# Patient Record
Sex: MALE | Race: WHITE | Employment: OTHER | URBAN - NONMETROPOLITAN AREA
[De-identification: names, ages, dates, MRNs, and addresses within clinical notes are randomized per-mention and may not be internally consistent; named-entity substitution may affect disease eponyms.]

---

## 2018-04-05 ENCOUNTER — APPOINTMENT (OUTPATIENT)
Dept: GENERAL RADIOLOGY | Facility: HOSPITAL | Age: 63
End: 2018-04-05
Attending: PHYSICIAN ASSISTANT
Payer: COMMERCIAL

## 2018-04-05 ENCOUNTER — HOSPITAL ENCOUNTER (EMERGENCY)
Facility: HOSPITAL | Age: 63
Discharge: ANOTHER HOSPITAL | End: 2018-04-05
Attending: PHYSICIAN ASSISTANT | Admitting: PHYSICIAN ASSISTANT
Payer: COMMERCIAL

## 2018-04-05 VITALS
SYSTOLIC BLOOD PRESSURE: 106 MMHG | HEIGHT: 72 IN | WEIGHT: 192 LBS | DIASTOLIC BLOOD PRESSURE: 55 MMHG | HEART RATE: 61 BPM | RESPIRATION RATE: 16 BRPM | TEMPERATURE: 97.9 F | OXYGEN SATURATION: 94 % | BODY MASS INDEX: 26.01 KG/M2

## 2018-04-05 DIAGNOSIS — I21.3 ST ELEVATION MYOCARDIAL INFARCTION (STEMI), UNSPECIFIED ARTERY (H): ICD-10-CM

## 2018-04-05 LAB
ANION GAP SERPL CALCULATED.3IONS-SCNC: 6 MMOL/L (ref 3–14)
BASOPHILS # BLD AUTO: 0.1 10E9/L (ref 0–0.2)
BASOPHILS NFR BLD AUTO: 1.2 %
BUN SERPL-MCNC: 16 MG/DL (ref 7–30)
CALCIUM SERPL-MCNC: 8.7 MG/DL (ref 8.5–10.1)
CHLORIDE SERPL-SCNC: 107 MMOL/L (ref 94–109)
CO2 SERPL-SCNC: 26 MMOL/L (ref 20–32)
CREAT SERPL-MCNC: 1.1 MG/DL (ref 0.66–1.25)
DIFFERENTIAL METHOD BLD: NORMAL
EOSINOPHIL # BLD AUTO: 0.3 10E9/L (ref 0–0.7)
EOSINOPHIL NFR BLD AUTO: 3.1 %
ERYTHROCYTE [DISTWIDTH] IN BLOOD BY AUTOMATED COUNT: 12.5 % (ref 10–15)
GFR SERPL CREATININE-BSD FRML MDRD: 67 ML/MIN/1.7M2
GLUCOSE SERPL-MCNC: 99 MG/DL (ref 70–99)
HCT VFR BLD AUTO: 46.4 % (ref 40–53)
HGB BLD-MCNC: 16 G/DL (ref 13.3–17.7)
IMM GRANULOCYTES # BLD: 0 10E9/L (ref 0–0.4)
IMM GRANULOCYTES NFR BLD: 0.4 %
LYMPHOCYTES # BLD AUTO: 2.5 10E9/L (ref 0.8–5.3)
LYMPHOCYTES NFR BLD AUTO: 30.8 %
MCH RBC QN AUTO: 31.7 PG (ref 26.5–33)
MCHC RBC AUTO-ENTMCNC: 34.5 G/DL (ref 31.5–36.5)
MCV RBC AUTO: 92 FL (ref 78–100)
MONOCYTES # BLD AUTO: 0.8 10E9/L (ref 0–1.3)
MONOCYTES NFR BLD AUTO: 9.6 %
NEUTROPHILS # BLD AUTO: 4.4 10E9/L (ref 1.6–8.3)
NEUTROPHILS NFR BLD AUTO: 54.9 %
NRBC # BLD AUTO: 0 10*3/UL
NRBC BLD AUTO-RTO: 0 /100
PLATELET # BLD AUTO: 221 10E9/L (ref 150–450)
POTASSIUM SERPL-SCNC: 3.9 MMOL/L (ref 3.4–5.3)
RBC # BLD AUTO: 5.04 10E12/L (ref 4.4–5.9)
SODIUM SERPL-SCNC: 139 MMOL/L (ref 133–144)
TROPONIN I SERPL-MCNC: 0.38 UG/L (ref 0–0.04)
WBC # BLD AUTO: 8 10E9/L (ref 4–11)

## 2018-04-05 PROCEDURE — 85025 COMPLETE CBC W/AUTO DIFF WBC: CPT | Performed by: PHYSICIAN ASSISTANT

## 2018-04-05 PROCEDURE — 25000128 H RX IP 250 OP 636: Performed by: PHYSICIAN ASSISTANT

## 2018-04-05 PROCEDURE — 99284 EMERGENCY DEPT VISIT MOD MDM: CPT | Mod: Z6 | Performed by: PHYSICIAN ASSISTANT

## 2018-04-05 PROCEDURE — 80048 BASIC METABOLIC PNL TOTAL CA: CPT | Performed by: PHYSICIAN ASSISTANT

## 2018-04-05 PROCEDURE — 96375 TX/PRO/DX INJ NEW DRUG ADDON: CPT

## 2018-04-05 PROCEDURE — 25000132 ZZH RX MED GY IP 250 OP 250 PS 637: Performed by: PHYSICIAN ASSISTANT

## 2018-04-05 PROCEDURE — 71045 X-RAY EXAM CHEST 1 VIEW: CPT | Mod: TC

## 2018-04-05 PROCEDURE — 93010 ELECTROCARDIOGRAM REPORT: CPT | Performed by: INTERNAL MEDICINE

## 2018-04-05 PROCEDURE — 96374 THER/PROPH/DIAG INJ IV PUSH: CPT

## 2018-04-05 PROCEDURE — 84484 ASSAY OF TROPONIN QUANT: CPT | Performed by: PHYSICIAN ASSISTANT

## 2018-04-05 PROCEDURE — 99285 EMERGENCY DEPT VISIT HI MDM: CPT | Mod: 25

## 2018-04-05 PROCEDURE — 93005 ELECTROCARDIOGRAM TRACING: CPT

## 2018-04-05 PROCEDURE — 36415 COLL VENOUS BLD VENIPUNCTURE: CPT | Performed by: PHYSICIAN ASSISTANT

## 2018-04-05 RX ORDER — ASPIRIN 81 MG/1
324 TABLET, CHEWABLE ORAL ONCE
Status: COMPLETED | OUTPATIENT
Start: 2018-04-05 | End: 2018-04-05

## 2018-04-05 RX ORDER — MORPHINE SULFATE 4 MG/ML
2 INJECTION, SOLUTION INTRAMUSCULAR; INTRAVENOUS ONCE
Status: COMPLETED | OUTPATIENT
Start: 2018-04-05 | End: 2018-04-05

## 2018-04-05 RX ORDER — MORPHINE SULFATE 4 MG/ML
4 INJECTION, SOLUTION INTRAMUSCULAR; INTRAVENOUS ONCE
Status: COMPLETED | OUTPATIENT
Start: 2018-04-05 | End: 2018-04-05

## 2018-04-05 RX ORDER — NITROGLYCERIN 0.4 MG/1
0.4 TABLET SUBLINGUAL EVERY 5 MIN PRN
Status: DISCONTINUED | OUTPATIENT
Start: 2018-04-05 | End: 2018-04-05 | Stop reason: HOSPADM

## 2018-04-05 RX ORDER — MORPHINE SULFATE 4 MG/ML
INJECTION, SOLUTION INTRAMUSCULAR; INTRAVENOUS
Status: DISCONTINUED
Start: 2018-04-05 | End: 2018-04-05 | Stop reason: HOSPADM

## 2018-04-05 RX ORDER — HEPARIN SODIUM 10000 [USP'U]/100ML
0-3500 INJECTION, SOLUTION INTRAVENOUS CONTINUOUS
Status: DISCONTINUED | OUTPATIENT
Start: 2018-04-05 | End: 2018-04-05 | Stop reason: HOSPADM

## 2018-04-05 RX ORDER — MORPHINE SULFATE 4 MG/ML
2 INJECTION, SOLUTION INTRAMUSCULAR; INTRAVENOUS EVERY 4 HOURS PRN
Status: DISCONTINUED | OUTPATIENT
Start: 2018-04-05 | End: 2018-04-05 | Stop reason: HOSPADM

## 2018-04-05 RX ADMIN — MORPHINE SULFATE 2 MG: 4 INJECTION, SOLUTION INTRAMUSCULAR; INTRAVENOUS at 10:17

## 2018-04-05 RX ADMIN — MORPHINE SULFATE 2 MG: 4 INJECTION, SOLUTION INTRAMUSCULAR; INTRAVENOUS at 10:28

## 2018-04-05 RX ADMIN — NITROGLYCERIN 0.4 MG: 0.4 TABLET SUBLINGUAL at 10:17

## 2018-04-05 RX ADMIN — SODIUM CHLORIDE 1000 ML: 9 INJECTION, SOLUTION INTRAVENOUS at 10:22

## 2018-04-05 RX ADMIN — TICAGRELOR 180 MG: 90 TABLET ORAL at 10:26

## 2018-04-05 RX ADMIN — NITROGLYCERIN 0.4 MG: 0.4 TABLET SUBLINGUAL at 10:21

## 2018-04-05 RX ADMIN — ASPIRIN 81 MG 324 MG: 81 TABLET ORAL at 10:16

## 2018-04-05 RX ADMIN — MORPHINE SULFATE 4 MG: 4 INJECTION, SOLUTION INTRAMUSCULAR; INTRAVENOUS at 10:38

## 2018-04-05 ASSESSMENT — ENCOUNTER SYMPTOMS
TROUBLE SWALLOWING: 0
SHORTNESS OF BREATH: 1
EYE DISCHARGE: 0
RHINORRHEA: 0
DIZZINESS: 1
CHEST TIGHTNESS: 0
FEVER: 0
FATIGUE: 0
COUGH: 0
CHILLS: 0
HEADACHES: 0
PALPITATIONS: 0
PSYCHIATRIC NEGATIVE: 1
LIGHT-HEADEDNESS: 0
WHEEZING: 0
SINUS PRESSURE: 0
SORE THROAT: 0

## 2018-04-05 NOTE — ED NOTES
Portable CXR done. Patient tolerated the positioning without difficulty. No change in the chest pain at this time.

## 2018-04-05 NOTE — ED NOTES
Critical result of  Trop value of 0.382  Reported to  Erwin GARCIA  Time given to provider 1036  Carito Mcdonald RN 10:36 AM 4/5/2018

## 2018-04-05 NOTE — ED NOTES
The patient's wife and daughter arrived to the ED and updated on the patient's status. The patient continues to report chest pain midsternal into the throat at 8/10 with no change after nitroglycerin or morphine at this time. Cardiac monitor remains in sinus piero 48-54, no ectopy. Diaphoretic and pale. Respirations unlabored.

## 2018-04-05 NOTE — ED NOTES
Patient reports no improvement in his mid chest pain with the nitroglycerin. Patient noted to be diaphoretic, pale, and heart rate in the 45-50's. No ectopy noted. ST elevation noted on the cardiac monitor.

## 2018-04-05 NOTE — ED NOTES
"The patient presents with midsternal chest pain that radiates into the throat area. He reports onset yesterday, the pain has been off and on, the most recent episode onset while he was walking into the ED. States it feels like his \"heartburn but worse\". States the normal Zantac and Pepto Bismol has not worked for the pain yesterday or today. Reports worse when walking, rates 7/10 currently. The patient reports he was at work prior to arrival.  "

## 2018-04-05 NOTE — ED NOTES
EKG done and to the provider. Patient is stating his midsternal chest pain is worsening from 7/10 to 8/10 and radiating to the jaw bilaterally. Provider notified.

## 2018-04-05 NOTE — ED NOTES
"Patient departure with the [facility] crew to the helicopter for transfer to [facility] in [place] for cardiology services. On departure the patient continued to have chest pain 8/10, midsternal with radiation to the throat with \"burning\" sensation. Cardiac monitor in a sinus bradycardia 48-50's with no ectopy and ST elevation noted on the cardiac monitor on departure. Patient remained diaphoretic and pale, respirations unlabored.  "

## 2018-04-05 NOTE — ED PROVIDER NOTES
History     Chief Complaint   Patient presents with     Chest Pain     midsternal nonradiating. onset lat night. took otc gerd medication with no relief     HPI  Adolph Mott is a 62 year old male with Hx of hypothyroidism who presents with 2 days of chest pain. It started yesterday and let up at night, allowing Adolph to sleep. He woke up this morning with return of pain. He took zantac, pepto and his wife's GERD medicine with NO improvement. He went to work and taking the stairs caused pain and shortness of breath, prompting visit today. Pain is sharp-burning, substernal and     Problem List:    Patient Active Problem List    Diagnosis Date Noted     Traumatic amputation of fingertip      Priority: Medium        Past Medical History:    Past Medical History:   Diagnosis Date     Traumatic amputation of fingertip        Past Surgical History:    Past Surgical History:   Procedure Laterality Date     AMPUTATE FINGER(S) Right 7/26/2016    Procedure: AMPUTATE FINGER(S);  Surgeon: Francisco Dennis MD;  Location: [facility] OR       Family History:    Family History   Problem Relation Age of Onset     Family history unknown: Yes       Social History:  Marital Status:   [2]  Social History   Substance Use Topics     Smoking status: Current Every Day Smoker     Packs/day: 0.25     Years: 40.00     Types: Cigarettes     Last attempt to quit: 8/1/2016     Smokeless tobacco: Not on file     Alcohol use Not on file        Medications:      ASPIRIN PO   LEVOTHYROXINE SODIUM PO   Naproxen Sodium (ALEVE PO)         Review of Systems   Constitutional: Negative for chills, fatigue and fever.   HENT: Negative for congestion, ear pain, postnasal drip, rhinorrhea, sinus pressure, sore throat and trouble swallowing.    Eyes: Negative for discharge.   Respiratory: Positive for shortness of breath. Negative for cough, chest tightness and wheezing.    Cardiovascular: Positive for chest pain. Negative for palpitations.   Skin:  Negative.    Neurological: Positive for dizziness. Negative for light-headedness and headaches.   Psychiatric/Behavioral: Negative.        Physical Exam   BP: 168/91  Pulse: 61  Heart Rate: 52  Temp: 97.9  F (36.6  C)  Resp: 18  Height: 182.9 cm (6')  Weight: 87.1 kg (192 lb)  SpO2: 97 %      Physical Exam   Constitutional: He is oriented to person, place, and time. He appears well-developed and well-nourished. No distress (pt initially in NAD, pleasant, talkative).   Eyes: Conjunctivae are normal.   Cardiovascular: Normal rate and normal heart sounds.    Pulmonary/Chest: Effort normal and breath sounds normal. He exhibits tenderness (anterior as indicated).       Abdominal: Soft. Bowel sounds are normal. There is no tenderness.   Neurological: He is alert and oriented to person, place, and time.   Skin: Skin is warm and dry.   Psychiatric: He has a normal mood and affect.   Nursing note and vitals reviewed.      ED Course     ED Course     Procedures               EKG Interpretation:      Interpreted by Erwin Mendez  Time reviewed: 1012  Symptoms at time of EKG: CP   Rhythm: sinus bradycardia  Rate: 52  Axis: Normal  Ectopy: none  Conduction: nonspecific interventricular conduction block  ST Segments/ T Waves: ST Segement Elevation II, III and aVF  Comparison to prior: No old EKG available    Clinical Impression: myocardial infarction  inferior         Results for orders placed or performed during the hospital encounter of 04/05/18 (from the past 24 hour(s))   CBC with platelets differential   Result Value Ref Range    WBC 8.0 4.0 - 11.0 10e9/L    RBC Count 5.04 4.4 - 5.9 10e12/L    Hemoglobin 16.0 13.3 - 17.7 g/dL    Hematocrit 46.4 40.0 - 53.0 %    MCV 92 78 - 100 fl    MCH 31.7 26.5 - 33.0 pg    MCHC 34.5 31.5 - 36.5 g/dL    RDW 12.5 10.0 - 15.0 %    Platelet Count 221 150 - 450 10e9/L    Diff Method Automated Method     % Neutrophils 54.9 %    % Lymphocytes 30.8 %    % Monocytes 9.6 %    % Eosinophils 3.1 %     % Basophils 1.2 %    % Immature Granulocytes 0.4 %    Nucleated RBCs 0 0 /100    Absolute Neutrophil 4.4 1.6 - 8.3 10e9/L    Absolute Lymphocytes 2.5 0.8 - 5.3 10e9/L    Absolute Monocytes 0.8 0.0 - 1.3 10e9/L    Absolute Eosinophils 0.3 0.0 - 0.7 10e9/L    Absolute Basophils 0.1 0.0 - 0.2 10e9/L    Abs Immature Granulocytes 0.0 0 - 0.4 10e9/L    Absolute Nucleated RBC 0.0    Basic metabolic panel   Result Value Ref Range    Sodium 139 133 - 144 mmol/L    Potassium 3.9 3.4 - 5.3 mmol/L    Chloride 107 94 - 109 mmol/L    Carbon Dioxide 26 20 - 32 mmol/L    Anion Gap 6 3 - 14 mmol/L    Glucose 99 70 - 99 mg/dL    Urea Nitrogen 16 7 - 30 mg/dL    Creatinine 1.10 0.66 - 1.25 mg/dL    GFR Estimate 67 >60 mL/min/1.7m2    GFR Estimate If Black 82 >60 mL/min/1.7m2    Calcium 8.7 8.5 - 10.1 mg/dL   Troponin I   Result Value Ref Range    Troponin I ES 0.382 ([facility]) 0.000 - 0.045 ug/L   XR Chest Port 1 View    Narrative    PROCEDURE: XR CHEST PORT 1 VW 4/5/2018 10:43 AM    HISTORY: Chest pain;     COMPARISONS: 7/11/2010.    TECHNIQUE: Portable views.    FINDINGS: Heart is not enlarged. There is mild elongation of the  thoracic aorta. Lungs are clear and no pleural effusion is seen.         Impression    IMPRESSION: No acute disease.    ASHER QUIGLEY MD       Medications   morphine (PF) injection 2 mg (2 mg Intravenous Given 4/5/18 1028)   nitroGLYcerin (NITROSTAT) sublingual tablet 0.4 mg (0.4 mg Sublingual Given 4/5/18 1021)   heparin  drip 25,000 units in 0.45% NaCl 250 mL (see additional administration details for dose) (0 Units/hr Intravenous Not Given 4/5/18 1045)   aspirin chewable tablet 324 mg (324 mg Oral Given 4/5/18 1016)   ticagrelor (BRILINTA) tablet 180 mg (180 mg Oral Given 4/5/18 1026)   heparin Loading Dose from infusion pump *Give when STARTING heparin infusion 5,250 Units (4,000 Units Intravenous Given 4/5/18 1048)   morphine (PF) injection 2 mg (2 mg Intravenous Given 4/5/18 1017)   morphine (PF)  injection 4 mg (4 mg Intravenous Given 4/5/18 1038)   0.9% sodium chloride BOLUS (0 mLs Intravenous ED Infusing on Admission/transfer 4/5/18 1054)       Assessments & Plan (with Medical Decision Making)     I have reviewed the nursing notes.  I have reviewed the findings, diagnosis, plan and need for follow up with the patient.      New Prescriptions    No medications on file       Final diagnoses:   ST elevation myocardial infarction (STEMI), unspecified artery ([facility])   ASA administered, EKG obtained and reveals STEMI. Pt initially received 1 SL nitro and morphine with mild improvement, so given second sublingual nitro and additional 2mg morphine bringing BP down to 90s/70s but no improvement with pain.  I consulted with Dr Alan and Dr Kiser and Adolph will be transferred by air; he received Brilinta 180 and heparin protocol. Portable CXR in process. 4 additional mg morphine administered prior to air transport.    4/5/2018   [facility]       Erwin Mendez PA  04/05/18 1122

## 2018-04-05 NOTE — ED NOTES
[facility] helicopter crew arrived and given hand off report on patient status and to initiate Heparin Drip Infusion.

## 2018-04-11 ENCOUNTER — HOSPITAL ENCOUNTER (OUTPATIENT)
Dept: CARDIAC REHAB | Facility: HOSPITAL | Age: 63
Setting detail: THERAPIES SERIES
End: 2018-04-11
Attending: INTERNAL MEDICINE
Payer: COMMERCIAL

## 2018-04-11 VITALS — WEIGHT: 191 LBS | HEIGHT: 72 IN | BODY MASS INDEX: 25.87 KG/M2

## 2018-04-11 PROCEDURE — 40000116 ZZH STATISTIC OP CR VISIT

## 2018-04-11 PROCEDURE — 93798 PHYS/QHP OP CAR RHAB W/ECG: CPT

## 2018-04-11 ASSESSMENT — 6 MINUTE WALK TEST (6MWT)
TOTAL DISTANCE WALKED (FT): 1595
GENDER SELECTION: MALE
FEMALE CALC: 1606.95
MALE CALC: 1988.8
PREDICTED: 2000.93

## 2018-04-11 NOTE — PROGRESS NOTES
04/11/18 0900   Session   Session Initial Evaluation and Exercise Prescription   Certified through this date 05/10/18   Cardiac Rehab Assessment   Cardiac Rehab Assessment 4/11: Pt has initiated Phase II cardiac rehab following STEMI and PCI to RCA on 4/5/18. Following PCI procedure pt did experience fast rhythm of atrial fibrillation followed by asystole and was cardioverted twice. Pt's rhythm returned to normal sinus without further complications. Staff has placed pt as a high risk pt due to cardiac complications following surgery and will continue to monitor and assess pt during Phase II rehab.     Overall, pt is doing well post discharge and has resumed previous walking regimen of 1 mile most days of the week. Pt tolerates walking program but does experience occasional episodes of SOB since being started on cardiac medications. Staff unable to verify medications at the present but has encouraged pt to bring med list during on 4/13/18, for 1st exercise class. Pt verbally understands and agrees to the terms. Staff is certain pt was prescribed Brilinta and pt is experiencing the common side effect of SOB.     Staff discussed pt's resume to work and it is apparent that pt is unaware of the date in which they can return to employment. Pt is eager to become physically active and return to previous activities without extra precautions. Pt has follow-up with PCP on Monday, April 16th, 2018, and pt is hoping they will sharon pt permission to return to work sooner rather than later. If pt does return to work, staff has offered pt a later class time but is aware that pt may not be able to attend educational sessions due to timing. Staff can provide pt with educational handouts if there is a desire to learn about personal risk factor and they are unable to attend sessions in person.    The patient's history and clinical status including hemodynamics and ECG were evaluated. The patient was assessed to be stable and  "appropriate to begin exercise.   The patient's functional capacity and exercise prescription were determined by the completion of the 6 minute walk test. See results above. The patient was oriented to the program.  Risk factor profile was completed. Goals and objectives were discussed. CV response was WNL. No symptoms, complaints or pain were reported. Good prognosis for reaching goals below. Skilled therapy is necessary in order to monitor CV response to exercise, to provide education on risk factors and behavior change counseling needed to achieve patient's goals.  Plan to progress to 30-40 minutes of exercise prior to discharge from cardiac rehab.  Initial THR of 20-30 beats above RHR; Effort rating of 4-6. Initiate muscle conditioning as appropriate. Provide risk factor education and behavior change counseling.    General Information   Treatment Diagnosis STEMI   Date of Treatment Diagnosis 04/05/18   Secondary Treatment Diagnosis Stent   Significant Past CV History None   Lead up symptoms 4/11: Pt experienced \"heart burn\" on 4/4/18, left work early and took Zantac. Pain subsided slightly and pt went to bed. Following morning pt was at work climbing flights of stairs and noticed \"heart burn\" had returned and took a coffee break to take more medications for symptoms. Pain began to radiate through chest and upper jaw, pt decided to visit ED in [place].   Hospital Location [facility] Discharge Date 04/07/18   Signs and Symptoms Post Hospital Discharge None   Outpatient Cardiac Rehab Start Date 04/11/18   Primary Physician Virgil   Primary Physician Follow Up Scheduled  (April 16th, 2018)   Cardiologist Rashel   Cardiologist Follow Up Scheduled  (May 5th, 2018)   Ejection Fraction 45%   Risk Stratification High   Summary of Cath Report   Summary of Cath Report Available   Date Performed 04/05/18   Left Main Normal   LAD Normal   D1 Normal   D2 Normal   LCX Normal    OM Ostial OM1 70%, normal OM2 "   RCA Mid 100% thrombotic lesion   Cath Report Comments 4/11: Electrical cardioversion x2 for hemodynamically unstable rapid atrial fibrillation with successful conversion to sinus rhythm.   Living and Work Status    Living Arrangements and Social Status house;spouse;other relative  (Son)   Support System Live with an adult;Check-in help as needed   Return to Employment Unknown  (Pt expects to return to work but is uncertain when.)   Occupation [employer]-    Preventative Medications   CMS recommended medications (Pt advised to bring current list of meds at next appt. )   Fall Risk Screen   Fall screen completed by Cardiac Rehab   Have you fallen 2 or more times in the past year? Yes   Have you fallen and had an injury in the past year? No   Timed Up and Go score (seconds) NA   Is patient a fall risk? No   Fall screen comments 4/11: Pt has tripped and fell due to uneven ground but does not have mobility concerns.    Pain   Patient Currently in Pain No   Physical Assessments   Incisions WNL   Edema None   Right Lung Sounds not assessed   Left Lung Sounds not assessed   Limitations No limitations   Comments 4/11: Pt's physical assessments are WNL.    Individualized Treatment Plan   Monitored Sessions Scheduled 24   Monitored Sessions Attended 1   Oxygen   Supplemental Oxygen needed No   Nutrition Management - Weight Management   Assessment Initial Assessment   Age 62   Weight 86.6 kg (191 lb)   Height 1.829 m (6')   BMI (Calculated) 25.96   Initial Rate Your Plate Score. Dietary tool to assess eating patterns. Scores range from 24 to 72. The higher the score the healthier the eating pattern. 46   Weight Management Comments 4/11: Pt is content with current weight but does note pt cannot tolerate previous quantity of food since PCI. Pt will scale back on the amount of food due to general malaise following meals.    Nutrition Management - Lipids   Lipids Labs Available   Date 04/05/18   Total  Cholesterol 173   Triglycerides 43   HDL 46      Prescribed Lipid Medication Yes   Lipid Comments 4/11: Pt does not have current list of medications during evaluation and has been encouraged to bring list upon next cardiac visit.    Nutrition Management - Diabetes   Diabetes No   Nutrition Management Summary   Dietary Recommendations Low Fat;Low Cholesterol;Low Sodium   Stages of Change for Diet Compliance Preparation   Interventions Planned Attend Nutrition Education Class(es)   Nutrition Summary Comments 4/11: Currently, pt does not have any questions pertaining to diet. Pt has been recommended to limit Na+ content and to reduce the amount of red meat meal options.    Nutrition Target Outcome Total Chol < 150, HDL > 40 (M), HDL > 50 (W), LDL < 70, Trig < 150   Psychosocial Management   Psychosocial Assessment Initial   Is there history of clinical depression or increased risk of depression? No previous history   Current Level of Stress per Patient Report Denies   Current Coping Skills Other (see comments)  (Watch TV, walk ,or work in garage)   Initial Patient Health Questionnaire -9 Score (PHQ-9) for depression. 5-9 Minimal symptoms, 10-14 Minor depression, 15-19 Major depression, moderately severe, > 20 Major depression, severe  0   Initial [facility] Survey score.  Quality of Life:   If total score > 25 review individual areas where patient rated a 4 or 5.  Consider patients current medical condition and what role that plays on the score.   Adjust treatment protocol to improve areas of concern.  Consider the following:  PHQ9 score, DASI, and re-assessment within the next 30 days to assist with developing treatments.  18   Stages of Change Preparation   Patient Goal No   Psychosocial Target Outcome Identify absence or presence of depression using valid screening tool   Other Core Components - Hypertension   History of or Diagnosis of Hypertension No   Currently taking Anti-Hypertensives No  "  Hypertension Comments 4/11: Staff notes in [facility] discharge that pt has not been prescribed beta blocker due to hypotension concerns. Pt's blood pressures WNL.   Other Core Components - Tobacco   History of Tobacco Use Yes   Quit Date or Planned Quit Date 04/05/18   Tobacco Use Status Recent (Quit < 6 mo ago)   Tobacco Habit Cigarettes   Tobacco Use per Day (average) 4-6 cigarettes   Years of Tobacco Use 45   Stages of Change Action   Tobacco Comments 4/11: Pt quit \"[place]\" following STEMI event on 4/5/18.   Other Core Components Summary   Interventions Planned None   Other Core Components Comments 4/11: Pt does not seek additional core components to be assessed.   Other Core Components Target Outcome BP < 140/90 or < 130/80 with DM or CKD   Activity/Exercise History   Activity/Exercise Assessment Initial   Activity/Exercise Status prior to event? Was Physically Active   Number of Days Currently participating in Moderate Physical Activity? 4-5   Number of Days Currently performing  Aerobic Exercise (including rehab)? 0   Number of Minutes per Session Currently of Aerobic Exercise (average)? 0   Current Stage of Change (Physical Activity) Maintenance   Current Stage of Change (Aerobic Exercise) Preparation   Patient Goals Goal #1   Goal #1 Description 4/11: Pt would like to walk 1.5-2 miles 4-5 days/week by June 15th, 2018.   Goal #1 Target Date 06/15/18   Goal #1 Progress Towards Goal 4/11: Pt walks 1 mile 4-5 days/week to visit with sister.    Activity/Exercise Comments 4/11: Pt states they are ready to resume work for the physicality of the job and to maintain activity. Pt has a hard time being sedentary and is ready to be released back to work,    Activity/Exercise Target Outcome An Accumulation of 150  Minutes of Aerobic Activity per Week   Exercise Assessment   6 Minute Walk Predicted - Gender Selection Male   6 Minute Walk Predicted (Male) 1988.8   6 Minute Walk Predicted (Female) 1606.95   Initial " 6 Minute Walk Distance (Feet) 1595 ft   Resting HR 55 bpm   Exercise  bpm   Post Exercise HR 58 bpm   Resting /62   Exercise /60   Post Exercise /60   Pre SpO2 98   While Exercising SpO2 98   Post SpO2 99   Effort Rating 4   Current MET Level 2.9   MET Level Goal 5   ECG Rhythm Sinus bradycardia   Ectopy None   Current Symptoms Denies symptoms   Limitations/Restrictions None   Exercise Prescription   Mode Treadmill;Recumbant bike;Arm Ergometer;Weights;Calisthenics   Duration/Time 15-30 min   Frequency 3 daysweek   THR (85% of age predicted max HR) 133.45   OMNI Effort Rating (0-10 Scale) 4-6/10   Progression Continuous bouts;Total exercise time of 20-30 minutes   Comments 4/11: Staff introduced to cardiac gym and will create exercise prescription that is appropriate for pt based on hemodynamic response.    Recommended Home Exercise   Type of Exercise Walking   Frequency (days per week) 4-5   Duration (minutes per session) 15-30 min   Effort Rating Recommended 4-6/10   30 Day Exercise Plan 4/11: Pt currently walking 1 mile most days of the week and is interested to walk 2x per day, before return to work.    Current Home Exercise   Type of Exercise Walking   Frequency (days per week) 4-5   Duration (minutes per session) 15 minute bouts    Follow-up/On-going Support   Provider follow-up needed on the following Other (see comments)  (SOB)   Comments 4/11: Once pt returns to Phase II with current medications, staff to fax PCP or cardiology about SOB if pt has been prescribed Brilinta.    Learning Assessment   Learner Patient   Primary Language English   Preferred Learning Style Listening;Reading;Demonstration;Pictures/Video   Barriers to Learning No barriers noted   Patient Education   Education recommended Anatomy and Physiology of the Heart;Blood Pressure;Exercise Principles;Heart Failure;Medication Overview;Muscle Conditioning;Nutrition;Risk Factors;Smoking Cessation   Education Comments 4/11:  Pt has been advised regarding education, to which pt sounds interested. Staff is aware that pt plans to resume work and may not be able to attend sessions if they return to working status.    Physician cosignature/electronic signature indicates approval of this ITP document. I have established, reviewed and made necessary changes to the individualized treatment plan and exercise prescription for this patient.

## 2018-04-13 ENCOUNTER — HOSPITAL ENCOUNTER (OUTPATIENT)
Dept: CARDIAC REHAB | Facility: HOSPITAL | Age: 63
Setting detail: THERAPIES SERIES
End: 2018-04-13
Attending: INTERNAL MEDICINE
Payer: COMMERCIAL

## 2018-04-13 PROCEDURE — 40000116 ZZH STATISTIC OP CR VISIT

## 2018-04-13 PROCEDURE — 93798 PHYS/QHP OP CAR RHAB W/ECG: CPT

## 2018-04-16 ENCOUNTER — HOSPITAL ENCOUNTER (OUTPATIENT)
Dept: CARDIAC REHAB | Facility: HOSPITAL | Age: 63
Setting detail: THERAPIES SERIES
End: 2018-04-16
Attending: INTERNAL MEDICINE
Payer: COMMERCIAL

## 2018-04-16 PROCEDURE — 40000116 ZZH STATISTIC OP CR VISIT

## 2018-04-16 PROCEDURE — 93798 PHYS/QHP OP CAR RHAB W/ECG: CPT

## 2018-04-18 ENCOUNTER — HOSPITAL ENCOUNTER (OUTPATIENT)
Dept: CARDIAC REHAB | Facility: HOSPITAL | Age: 63
Setting detail: THERAPIES SERIES
End: 2018-04-18
Attending: INTERNAL MEDICINE
Payer: COMMERCIAL

## 2018-04-18 PROCEDURE — 40000116 ZZH STATISTIC OP CR VISIT

## 2018-04-18 PROCEDURE — 93798 PHYS/QHP OP CAR RHAB W/ECG: CPT

## 2018-04-20 ENCOUNTER — HOSPITAL ENCOUNTER (OUTPATIENT)
Dept: CARDIAC REHAB | Facility: HOSPITAL | Age: 63
Setting detail: THERAPIES SERIES
End: 2018-04-20
Attending: INTERNAL MEDICINE
Payer: COMMERCIAL

## 2018-04-20 PROCEDURE — 93798 PHYS/QHP OP CAR RHAB W/ECG: CPT

## 2018-04-20 PROCEDURE — 40000116 ZZH STATISTIC OP CR VISIT

## 2018-04-23 ENCOUNTER — HOSPITAL ENCOUNTER (OUTPATIENT)
Dept: CARDIAC REHAB | Facility: HOSPITAL | Age: 63
Setting detail: THERAPIES SERIES
End: 2018-04-23
Attending: INTERNAL MEDICINE
Payer: COMMERCIAL

## 2018-04-23 PROCEDURE — 40000116 ZZH STATISTIC OP CR VISIT

## 2018-04-23 PROCEDURE — 93798 PHYS/QHP OP CAR RHAB W/ECG: CPT

## 2018-04-25 ENCOUNTER — HOSPITAL ENCOUNTER (OUTPATIENT)
Dept: CARDIAC REHAB | Facility: HOSPITAL | Age: 63
Setting detail: THERAPIES SERIES
End: 2018-04-25
Attending: INTERNAL MEDICINE
Payer: COMMERCIAL

## 2018-04-25 PROCEDURE — 40000116 ZZH STATISTIC OP CR VISIT

## 2018-04-25 PROCEDURE — 93798 PHYS/QHP OP CAR RHAB W/ECG: CPT

## 2018-04-27 ENCOUNTER — HOSPITAL ENCOUNTER (OUTPATIENT)
Dept: CARDIAC REHAB | Facility: HOSPITAL | Age: 63
Setting detail: THERAPIES SERIES
End: 2018-04-27
Attending: INTERNAL MEDICINE
Payer: COMMERCIAL

## 2018-04-27 PROCEDURE — 93798 PHYS/QHP OP CAR RHAB W/ECG: CPT

## 2018-04-27 PROCEDURE — 40000116 ZZH STATISTIC OP CR VISIT

## 2018-04-30 ENCOUNTER — HOSPITAL ENCOUNTER (OUTPATIENT)
Dept: CARDIAC REHAB | Facility: HOSPITAL | Age: 63
Setting detail: THERAPIES SERIES
End: 2018-04-30
Attending: INTERNAL MEDICINE
Payer: COMMERCIAL

## 2018-04-30 PROCEDURE — 40000116 ZZH STATISTIC OP CR VISIT

## 2018-04-30 PROCEDURE — 93798 PHYS/QHP OP CAR RHAB W/ECG: CPT

## 2018-05-02 ENCOUNTER — HOSPITAL ENCOUNTER (OUTPATIENT)
Dept: CARDIAC REHAB | Facility: HOSPITAL | Age: 63
Setting detail: THERAPIES SERIES
End: 2018-05-02
Attending: INTERNAL MEDICINE
Payer: COMMERCIAL

## 2018-05-02 PROCEDURE — 40000116 ZZH STATISTIC OP CR VISIT

## 2018-05-02 PROCEDURE — 93798 PHYS/QHP OP CAR RHAB W/ECG: CPT

## 2018-05-04 ENCOUNTER — HOSPITAL ENCOUNTER (OUTPATIENT)
Dept: CARDIAC REHAB | Facility: HOSPITAL | Age: 63
Setting detail: THERAPIES SERIES
End: 2018-05-04
Attending: INTERNAL MEDICINE
Payer: COMMERCIAL

## 2018-05-04 PROCEDURE — 93798 PHYS/QHP OP CAR RHAB W/ECG: CPT

## 2018-05-04 PROCEDURE — 40000116 ZZH STATISTIC OP CR VISIT

## 2018-05-07 ENCOUNTER — HOSPITAL ENCOUNTER (OUTPATIENT)
Dept: CARDIAC REHAB | Facility: HOSPITAL | Age: 63
Setting detail: THERAPIES SERIES
End: 2018-05-07
Attending: INTERNAL MEDICINE
Payer: COMMERCIAL

## 2018-05-07 PROCEDURE — 93798 PHYS/QHP OP CAR RHAB W/ECG: CPT

## 2018-05-07 PROCEDURE — 40000116 ZZH STATISTIC OP CR VISIT

## 2018-05-08 VITALS — HEIGHT: 72 IN | WEIGHT: 187 LBS | BODY MASS INDEX: 25.33 KG/M2

## 2018-05-08 ASSESSMENT — 6 MINUTE WALK TEST (6MWT)
MALE CALC: 1999.8
FEMALE CALC: 1620.75
TOTAL DISTANCE WALKED (FT): 1595
PREDICTED: 2011.99
GENDER SELECTION: MALE

## 2018-05-08 NOTE — PROGRESS NOTES
" 05/08/18 0900   Session   Session 30 Day Individualized Treatment Plan   Certified through this date 06/06/18   Cardiac Rehab Assessment   Cardiac Rehab Assessment 5/8/18: Patient has attended 12 sessions of Phase II Cardiac Rehab following his STEMI and PCI on 4/5/2018. Patient has been doing very well post procedure. He continues to attend CR exercise sessions 3x/week and is tolerating exercise sessions well without any complications. He has been reaching a peak of 6.9 METs during exercise sessions without adverse effects. Patient has only made one goal for his time in CR and that is to start walking 1.5-2 miles on 4-5 days per week but the end of June. Patient is working towards this goal by attending CR exercise sessions 3x/week in addition to walking daily for at least one mile. Patient plans to go back to work soon as a  and is wanting to ensure that he is properly conditioned to resume his work duties without issues. Patient had made no goals regarding diet/nutrition but has noticed that since he started CR, he has been losing weight. Patient has been encouraged to continue to watch his diet and sodium intake. Patient has not attended any education sessions thus far, but will be encouraged to participate as appropriate. He continues to abstain from smoking without relapse. He continues to take all medications as indicated. Skilled therapy is necessary in order to monitor CV response to exercise, to provide education on risk factors and behavior change counseling needed to achieve patient's goals.    General Information   Treatment Diagnosis STEMI   Date of Treatment Diagnosis 04/05/18   Secondary Treatment Diagnosis Stent   Significant Past CV History None   Lead up symptoms 4/11: Pt experienced \"heart burn\" on 4/4/18, left work early and took Zantac. Pain subsided slightly and pt went to bed. Following morning pt was at work climbing flights of stairs and noticed \"heart burn\" had returned and " took a coffee break to take more medications for symptoms. Pain began to radiate through chest and upper jaw, pt decided to visit ED in [place].   Hospital Location [facility] Discharge Date 04/07/18   Signs and Symptoms Post Hospital Discharge None   Outpatient Cardiac Rehab Start Date 04/11/18   Primary Physician Virgil   Primary Physician Follow Up Completed   Cardiologist Rashel   Cardiologist Follow Up Completed   Ejection Fraction 45%   Risk Stratification High   Summary of Cath Report   Summary of Cath Report Available   Date Performed 04/05/18   Left Main Normal   LAD Normal   D1 Normal   D2 Normal   LCX Normal    OM Ostial OM1 70%, normal OM2   RCA Mid 100% thrombotic lesion   Cath Report Comments 4/11: Electrical cardioversion x2 for hemodynamically unstable rapid atrial fibrillation with successful conversion to sinus rhythm.   Living and Work Status    Living Arrangements and Social Status house;spouse;other relative  (Son)   Support System Live with an adult;Check-in help as needed   Return to Employment Unknown  (Pt expects to return to work but is uncertain when.)   Occupation [employer]-    Preventative Medications   CMS recommended medications Antiplatelets;Beta Blocker;Ace inhibitors;Lipid Lowering   Fall Risk Screen   Fall screen completed by Cardiac Rehab   Have you fallen 2 or more times in the past year? Yes   Have you fallen and had an injury in the past year? No   Timed Up and Go score (seconds) NA   Is patient a fall risk? No   Fall screen comments 5/8: Patient is not a fall risk.   Pain   Patient Currently in Pain No   Physical Assessments   Incisions WNL   Edema None   Right Lung Sounds not assessed   Left Lung Sounds not assessed   Limitations No limitations   Comments 5/8: Patient's physical assessments are WNL.   Individualized Treatment Plan   Monitored Sessions Scheduled 24   Monitored Sessions Attended 12   Oxygen   Supplemental Oxygen needed No  "  Nutrition Management - Weight Management   Assessment Re-assessment   Age 62   Weight 84.8 kg (187 lb)   Height 1.829 m (6' 0.01\")   BMI (Calculated) 25.41   Initial Rate Your Plate Score. Dietary tool to assess eating patterns. Scores range from 24 to 72. The higher the score the healthier the eating pattern. 46   Weight Management Comments 5/8: Patient is content with current weight but has noticed that he has been losing some weight since starting CR exercise sessions.    Nutrition Management - Lipids   Lipids Labs Available   Date 04/05/18   Total Cholesterol 173   Triglycerides 43   HDL 46      Prescribed Lipid Medication Yes   Lipid Comments 5/8: Patient is taking medications as indicated.   Nutrition Management - Diabetes   Diabetes No   Nutrition Management Summary   Dietary Recommendations Low Fat;Low Cholesterol;Low Sodium   Stages of Change for Diet Compliance Preparation   Interventions Planned Attend Nutrition Education Class(es)   Nutrition Summary Comments 5/8: Patient does not have any dietary concerns and does not wish to establish goals regarding diet/nutrition. Staff will continue to encourage patient to follow low sodium diet.    Nutrition Target Outcome Total Chol < 150, HDL > 40 (M), HDL > 50 (W), LDL < 70, Trig < 150   Psychosocial Management   Psychosocial Assessment Re-assessment   Is there history of clinical depression or increased risk of depression? No previous history   Current Level of Stress per Patient Report Denies   Current Coping Skills Other (see comments)   Initial Patient Health Questionnaire -9 Score (PHQ-9) for depression. 5-9 Minimal symptoms, 10-14 Minor depression, 15-19 Major depression, moderately severe, > 20 Major depression, severe  0   Initial [facility] Survey score.  Quality of Life:   If total score > 25 review individual areas where patient rated a 4 or 5.  Consider patients current medical condition and what role that plays on the score.   Adjust " treatment protocol to improve areas of concern.  Consider the following:  PHQ9 score, DASI, and re-assessment within the next 30 days to assist with developing treatments.  18   Stages of Change Preparation   Interventions Planned Patient denies need for intervention at this time.   Patient Goal No   Psychosocial Comments Patient has no psychosocial concerns at this time.   Other Core Components - Hypertension   History of or Diagnosis of Hypertension No   Currently taking Anti-Hypertensives No   Hypertension Comments 5/8: Patient is on Metoprolol and Lisinopril and is taking them as directed.   Other Core Components - Tobacco   History of Tobacco Use Yes   Quit Date or Planned Quit Date 04/05/18   Tobacco Use Status Recent (Quit < 6 mo ago)   Tobacco Habit Cigarettes   Tobacco Use per Day (average) 4-6 cigarettes   Years of Tobacco Use 45   Stages of Change Action   Tobacco Comments 5/8: Patient continues to abstain from tobacco use.    Other Core Components Summary   Interventions Planned None   Patient Goals No   Other Core Components Comments 5/8: Patient does not require changes in other core component areas.   Other Core Components Target Outcome BP < 140/90 or < 130/80 with DM or CKD   Activity/Exercise History   Activity/Exercise Assessment Re-assessment   Activity/Exercise Status prior to event? Was Physically Active   Number of Days Currently participating in Moderate Physical Activity? 4-5   Number of Days Currently performing  Aerobic Exercise (including rehab)? 4   Number of Minutes per Session Currently of Aerobic Exercise (average)? 4   Current Stage of Change (Physical Activity) Maintenance   Current Stage of Change (Aerobic Exercise) Action   Patient Goals Goal #1   Goal #1 Description 4/11: Pt would like to walk 1.5-2 miles 4-5 days/week by June 15th, 2018.   Goal #1 Target Date 06/15/18   Goal #1 Progress Towards Goal 5/8: Patient has been walking daily at home and is increasing total distance and  time gradually in addition to attending CR exercise sessions.    Activity/Exercise Comments 5/8: Patient has been tolerating activities well and will be ready to go back to work without issues.   Activity/Exercise Target Outcome An Accumulation of 150  Minutes of Aerobic Activity per Week   Exercise Assessment   6 Minute Walk Predicted - Gender Selection Male   6 Minute Walk Predicted (Male) 1999.8   6 Minute Walk Predicted (Female) 1620.75   Initial 6 Minute Walk Distance (Feet) 1595 ft   Resting HR 66 bpm   Exercise  bpm   Post Exercise HR 76 bpm   Resting /62   Exercise /70   Post Exercise /60   Effort Rating 4-5   Current MET Level 6.9   MET Level Goal 8-9   ECG Rhythm Sinus bradycardia;Normal sinus rhythm   Ectopy PVCs   Current Symptoms Denies symptoms   Limitations/Restrictions None   Exercise Prescription   Mode Treadmill;Recumbant bike;Arm Ergometer;Weights;Calisthenics   Duration/Time 45-60 min   Frequency 3 daysweek   THR (85% of age predicted max HR) 133.45   OMNI Effort Rating (0-10 Scale) 4-6/10   Progression Continuous bouts;Total exercise time of 30-45 minutes;Aerobic exercise to OMNI rating of 5-7, and heart rate at or below target   Comments 5/8: Patient has been tolerating exercise sessions well without event. Continue to progress AT.   Recommended Home Exercise   Type of Exercise Walking   Frequency (days per week) 4-5   Duration (minutes per session) 15-30 min   Effort Rating Recommended 4-6/10   30 Day Exercise Plan 5/8: Patient is to continue gradually increasing home walking program as tolerated in addition to attending CR exercise sessions 3x/week,    Current Home Exercise   Type of Exercise Walking   Frequency (days per week) 4-5   Duration (minutes per session) 15-30   Follow-up/On-going Support   Provider follow-up needed on the following No follow-up needed   Learning Assessment   Learner Patient   Primary Language English   Preferred Learning Style  Listening;Reading;Demonstration;Pictures/Video   Barriers to Learning No barriers noted   Patient Education   Education recommended Anatomy and Physiology of the Heart;Blood Pressure;Exercise Principles;Heart Failure;Medication Overview;Muscle Conditioning;Nutrition;Risk Factors;Smoking Cessation   Education classes attended Patient Declined/Refused All Education   Education Comments 5/8: Patient has been informed of education sessions, but has not attended any thus far. Will encourage participation as appropriate for patient.    Physician cosignature/electronic signature indicates approval of this ITP document. I have established, reviewed and made necessary changes to the individualized treatment plan and exercise prescription for this patient.

## 2018-05-09 ENCOUNTER — HOSPITAL ENCOUNTER (OUTPATIENT)
Dept: CARDIAC REHAB | Facility: HOSPITAL | Age: 63
Setting detail: THERAPIES SERIES
End: 2018-05-09
Attending: INTERNAL MEDICINE
Payer: COMMERCIAL

## 2018-05-09 PROCEDURE — 93798 PHYS/QHP OP CAR RHAB W/ECG: CPT

## 2018-05-09 PROCEDURE — 40000116 ZZH STATISTIC OP CR VISIT

## 2018-05-11 ENCOUNTER — HOSPITAL ENCOUNTER (OUTPATIENT)
Dept: CARDIAC REHAB | Facility: HOSPITAL | Age: 63
Setting detail: THERAPIES SERIES
End: 2018-05-11
Attending: INTERNAL MEDICINE
Payer: COMMERCIAL

## 2018-05-11 PROCEDURE — 93798 PHYS/QHP OP CAR RHAB W/ECG: CPT

## 2018-05-11 PROCEDURE — 40000116 ZZH STATISTIC OP CR VISIT

## 2018-05-14 ENCOUNTER — HOSPITAL ENCOUNTER (OUTPATIENT)
Dept: CARDIAC REHAB | Facility: HOSPITAL | Age: 63
Setting detail: THERAPIES SERIES
End: 2018-05-14
Attending: INTERNAL MEDICINE
Payer: COMMERCIAL

## 2018-05-14 PROCEDURE — 93798 PHYS/QHP OP CAR RHAB W/ECG: CPT

## 2018-05-14 PROCEDURE — 40000116 ZZH STATISTIC OP CR VISIT

## 2018-05-16 ENCOUNTER — HOSPITAL ENCOUNTER (OUTPATIENT)
Dept: CARDIAC REHAB | Facility: HOSPITAL | Age: 63
Setting detail: THERAPIES SERIES
End: 2018-05-16
Attending: INTERNAL MEDICINE
Payer: COMMERCIAL

## 2018-05-16 PROCEDURE — 40000116 ZZH STATISTIC OP CR VISIT

## 2018-05-16 PROCEDURE — 93798 PHYS/QHP OP CAR RHAB W/ECG: CPT

## 2018-05-18 ENCOUNTER — HOSPITAL ENCOUNTER (OUTPATIENT)
Dept: CARDIAC REHAB | Facility: HOSPITAL | Age: 63
Setting detail: THERAPIES SERIES
End: 2018-05-18
Attending: INTERNAL MEDICINE
Payer: COMMERCIAL

## 2018-05-18 PROCEDURE — 40000116 ZZH STATISTIC OP CR VISIT

## 2018-05-18 PROCEDURE — 93798 PHYS/QHP OP CAR RHAB W/ECG: CPT

## 2018-05-21 ENCOUNTER — HOSPITAL ENCOUNTER (OUTPATIENT)
Dept: CARDIAC REHAB | Facility: HOSPITAL | Age: 63
Setting detail: THERAPIES SERIES
End: 2018-05-21
Attending: INTERNAL MEDICINE
Payer: COMMERCIAL

## 2018-05-21 PROCEDURE — 93798 PHYS/QHP OP CAR RHAB W/ECG: CPT

## 2018-05-21 PROCEDURE — 40000116 ZZH STATISTIC OP CR VISIT

## 2018-05-23 ENCOUNTER — HOSPITAL ENCOUNTER (OUTPATIENT)
Dept: CARDIAC REHAB | Facility: HOSPITAL | Age: 63
Setting detail: THERAPIES SERIES
End: 2018-05-23
Attending: INTERNAL MEDICINE
Payer: COMMERCIAL

## 2018-05-23 PROCEDURE — 93798 PHYS/QHP OP CAR RHAB W/ECG: CPT

## 2018-05-23 PROCEDURE — 40000116 ZZH STATISTIC OP CR VISIT

## 2018-05-25 ENCOUNTER — HOSPITAL ENCOUNTER (OUTPATIENT)
Dept: CARDIAC REHAB | Facility: HOSPITAL | Age: 63
Setting detail: THERAPIES SERIES
End: 2018-05-25
Attending: INTERNAL MEDICINE
Payer: COMMERCIAL

## 2018-05-25 PROCEDURE — 93798 PHYS/QHP OP CAR RHAB W/ECG: CPT

## 2018-05-25 PROCEDURE — 40000116 ZZH STATISTIC OP CR VISIT

## 2018-05-30 ENCOUNTER — HOSPITAL ENCOUNTER (OUTPATIENT)
Dept: CARDIAC REHAB | Facility: HOSPITAL | Age: 63
Setting detail: THERAPIES SERIES
End: 2018-05-30
Attending: INTERNAL MEDICINE
Payer: COMMERCIAL

## 2018-05-30 PROCEDURE — 40000116 ZZH STATISTIC OP CR VISIT

## 2018-05-30 PROCEDURE — 93798 PHYS/QHP OP CAR RHAB W/ECG: CPT

## 2018-06-01 ENCOUNTER — HOSPITAL ENCOUNTER (OUTPATIENT)
Dept: CARDIAC REHAB | Facility: HOSPITAL | Age: 63
Setting detail: THERAPIES SERIES
End: 2018-06-01
Attending: INTERNAL MEDICINE
Payer: COMMERCIAL

## 2018-06-01 PROCEDURE — 93798 PHYS/QHP OP CAR RHAB W/ECG: CPT

## 2018-06-01 PROCEDURE — 40000116 ZZH STATISTIC OP CR VISIT

## 2018-06-04 ENCOUNTER — HOSPITAL ENCOUNTER (OUTPATIENT)
Dept: CARDIAC REHAB | Facility: HOSPITAL | Age: 63
Setting detail: THERAPIES SERIES
End: 2018-06-04
Attending: INTERNAL MEDICINE
Payer: COMMERCIAL

## 2018-06-04 PROCEDURE — 40000116 ZZH STATISTIC OP CR VISIT

## 2018-06-04 PROCEDURE — 93798 PHYS/QHP OP CAR RHAB W/ECG: CPT

## 2018-06-06 ENCOUNTER — HOSPITAL ENCOUNTER (OUTPATIENT)
Dept: CARDIAC REHAB | Facility: HOSPITAL | Age: 63
Setting detail: THERAPIES SERIES
End: 2018-06-06
Attending: INTERNAL MEDICINE
Payer: COMMERCIAL

## 2018-06-06 VITALS — BODY MASS INDEX: 25.19 KG/M2 | HEIGHT: 72 IN | WEIGHT: 186 LBS

## 2018-06-06 PROCEDURE — 93798 PHYS/QHP OP CAR RHAB W/ECG: CPT

## 2018-06-06 PROCEDURE — 40000116 ZZH STATISTIC OP CR VISIT

## 2018-06-06 ASSESSMENT — 6 MINUTE WALK TEST (6MWT)
GENDER SELECTION: MALE
MALE CALC: 2001.92
TOTAL DISTANCE WALKED (FT): 1810
FEMALE CALC: 1624.02
TOTAL DISTANCE WALKED (FT): 1595
PREDICTED: 2014.13

## 2018-06-06 NOTE — PROGRESS NOTES
" 06/06/18 1000   Session   Session Discharge Note   Certified through this date 06/06/18   Cardiac Rehab Assessment   Cardiac Rehab Assessment 6/6/18: Warren has completed 24 exercise sessions and no educational sessions in Phase II Cardiac Rehab. He was encouraged to attend education sessions, but he declined. He was given educational information as indicated. He quit smoking the day of his STEMI on 4/5/18. He states he is still refraining from tobacco use. He does say it is hard at times, but he is using hard candies such as life-savers to help him get through his urges. He is continuing to work on a heart healthy diet, his Rate Your Plate survey score did improve.   He is walking with his wife most days and says he walks over a mile at a time. He has not had any limitations on his activities, nor any further symptoms while in CR. He is scheduled to return to work on June 18, but he is discussing with his wife the possibility of retiring instead. He has worked for public [employer] for over 30 years.     Pt made significant gains in exercise tolerance. Initially patient tolerated 11 minutes at 2.9 METs, now tolerating 52 minutes at 7.8 METs, intermittently. Patient also increased 6-minute walk test by 40% (an increase of 515 feet.) The PT was given instructions on frequency, intensity, and duration for continued exercise as well as muscle conditioning and stretching exercises.  Your PT plans to continue with his walking regime, and considering exercising in the WEL program.       General Information   Treatment Diagnosis STEMI   Date of Treatment Diagnosis 04/05/18   Secondary Treatment Diagnosis Stent   Significant Past CV History None   Lead up symptoms 4/11: Pt experienced \"heart burn\" on 4/4/18, left work early and took Zantac. Pain subsided slightly and pt went to bed. Following morning pt was at work climbing flights of stairs and noticed \"heart burn\" had returned and took a coffee break to take more " medications for symptoms. Pain began to radiate through chest and upper jaw, pt decided to visit ED in [place].   Hospital Location [facility] Discharge Date 04/07/18   Signs and Symptoms Post Hospital Discharge None   Outpatient Cardiac Rehab Start Date 04/11/18   Primary Physician Virgil   Primary Physician Follow Up Completed   Cardiologist Rashel   Cardiologist Follow Up Completed   Ejection Fraction 45%   Risk Stratification High   Summary of Cath Report   Summary of Cath Report Available   Date Performed 04/05/18   Left Main Normal   LAD Normal   D1 Normal   D2 Normal   LCX Normal    OM Ostial OM1 70%, normal OM2   RCA Mid 100% thrombotic lesion   Cath Report Comments 4/11: Electrical cardioversion x2 for hemodynamically unstable rapid atrial fibrillation with successful conversion to sinus rhythm.   Living and Work Status    Living Arrangements and Social Status house;spouse;other relative  (Son)   Support System Live with an adult;Check-in help as needed   Return to Employment Unknown  (Patient scheduled to return to work on 6/18. )   Occupation [employer]-    Preventative Medications   CMS recommended medications Antiplatelets;Beta Blocker;Ace inhibitors;Lipid Lowering   Fall Risk Screen   Fall screen completed by Cardiac Rehab   Have you fallen 2 or more times in the past year? Yes   Have you fallen and had an injury in the past year? No   Timed Up and Go score (seconds) NA   Is patient a fall risk? No   Fall screen comments 6/6/18: Patient does not have any recent falls.    Pain   Patient Currently in Pain No   Physical Assessments   Incisions Not assessed   Edema Not assessed   Right Lung Sounds not assessed   Left Lung Sounds not assessed   Limitations No limitations   Comments 6/6/18: Patient has not had any issues during his time in cardiac rehab.    Individualized Treatment Plan   Monitored Sessions Scheduled 24   Monitored Sessions Attended 24   Oxygen    Supplemental Oxygen needed No   Nutrition Management - Weight Management   Assessment Discharge   Age 62   Weight 84.4 kg (186 lb)   Height 1.829 m (6')   BMI (Calculated) 25.28   Initial Rate Your Plate Score. Dietary tool to assess eating patterns. Scores range from 24 to 72. The higher the score the healthier the eating pattern. 46   Discharge Rate Your Plate Score 52   Weight Management Comments 5/18: Patient is working on following a heart healthy diet.    Nutrition Management - Lipids   Lipids Labs Available   Date 04/05/18   Total Cholesterol 173   Triglycerides 43   HDL 46      Prescribed Lipid Medication Yes   Lipid Comments 6/6: Patient takes medications as prescribed.    Nutrition Management - Diabetes   Diabetes No   Nutrition Management Summary   Dietary Recommendations Low Fat;Low Cholesterol;Low Sodium   Stages of Change for Diet Compliance Action   Interventions Planned Attend Nutrition Education Class(es)   Nutrition Summary Comments 6/6: Patient was not able to attend education sessions. He was given information regarding a heart healthy diet.    Nutrition Target Outcome Total Chol < 150, HDL > 40 (M), HDL > 50 (W), LDL < 70, Trig < 150   Psychosocial Management   Psychosocial Assessment Discharge   Is there history of clinical depression or increased risk of depression? No previous history   Current Level of Stress per Patient Report Denies   Current Coping Skills Other (see comments)  (Watch TV, walk ,or work in garage)   Initial Patient Health Questionnaire -9 Score (PHQ-9) for depression. 5-9 Minimal symptoms, 10-14 Minor depression, 15-19 Major depression, moderately severe, > 20 Major depression, severe  0   Discharge PHQ-9 Score for Depression 0   Initial [facility] Survey score.  Quality of Life:   If total score > 25 review individual areas where patient rated a 4 or 5.  Consider patients current medical condition and what role that plays on the score.   Adjust treatment  protocol to improve areas of concern.  Consider the following:  PHQ9 score, DASI, and re-assessment within the next 30 days to assist with developing treatments.  18   Discharge [facility] Survey Score 22   Stages of Change Preparation   Interventions Planned Patient denies need for intervention at this time.   Patient Goal No   Psychosocial Comments 6/6: Patient has no psychosocial concerns at this time.   Other Core Components - Hypertension   History of or Diagnosis of Hypertension No   Currently taking Anti-Hypertensives No   Hypertension Comments 6/6: Patient is on Metoprolol and Lisinopril and is taking them as directed.   Other Core Components - Tobacco   History of Tobacco Use Yes   Quit Date or Planned Quit Date 04/05/18   Tobacco Use Status Recent (Quit < 6 mo ago)   Tobacco Habit Cigarettes   Tobacco Use per Day (average) 4-6 cigarettes   Years of Tobacco Use 45   Stages of Change Action   Tobacco Comments 6/6: Patient states he is continuing to avoid cigarettes. He is using hard candies to help him get past his urges.    Other Core Components Summary   Interventions Planned None   Interventions In Progress or Completed Checked-in regularly, offered support to prevent risk of relapse   Patient Goals No   Other Core Components Comments 6/6: Patient is doing well in other core components.    Other Core Components Target Outcome BP < 140/90 or < 130/80 with DM or CKD   Activity/Exercise History   Activity/Exercise Assessment Discharge   Activity/Exercise Status prior to event? Was Physically Active   Number of Days Currently participating in Moderate Physical Activity? 7   Number of Days Currently performing  Aerobic Exercise (including rehab)? 7   Number of Minutes per Session Currently of Aerobic Exercise (average)? 30-45   Current Stage of Change (Physical Activity) Maintenance   Current Stage of Change (Aerobic Exercise) Action   Patient Goals Goal #1   Goal #1 Description 6/6: Pt would like to walk  1.5-2 miles 4-5 days/week by June 15th, 2018.   Goal #1 Target Date 06/15/18   Goal #1 Date Met 06/06/18   Goal #1 Progress Towards Goal 6/6: Patient is walking over one mile per day with his wife.    Activity/Exercise Comments 6/6: Patient has done well in CR and is motivated to continue exercise.    Activity/Exercise Target Outcome An Accumulation of 150  Minutes of Aerobic Activity per Week   Exercise Assessment   6 Minute Walk Predicted - Gender Selection Male   6 Minute Walk Predicted (Male) 2001.92   6 Minute Walk Predicted (Female) 1624.02   Initial 6 Minute Walk Distance (Feet) 1595 ft   Discharge 6 Minute Walk Distance (Feet) 1810   Resting HR 68 bpm   Exercise  bpm   Post Exercise HR 76 bpm   Resting /68   Exercise /68   Post Exercise /60   Pre SpO2 98   While Exercising SpO2 98   Post SpO2 98   Effort Rating 5-6   Current MET Level 7.8   MET Level Goal 8   ECG Rhythm Sinus bradycardia;Normal sinus rhythm   Ectopy PVCs   Current Symptoms Denies symptoms   Limitations/Restrictions None   Exercise Prescription   Mode Treadmill;Recumbant bike;Arm Ergometer;Weights;Calisthenics   Duration/Time 45-60 min   Frequency 3 daysweek   THR (85% of age predicted max HR) 133.45   OMNI Effort Rating (0-10 Scale) 4-6/10   Progression Continuous bouts;Total exercise time of 30-45 minutes;Aerobic exercise to OMNI rating of 5-7, and heart rate at or below target   Comments 6/6:Patient has done will with CR exercises.   Recommended Home Exercise   Type of Exercise Walking   Frequency (days per week) 7   Duration (minutes per session) 30-45 min   Effort Rating Recommended 4-6/10   30 Day Exercise Plan 6/6: Patient is taking frequent walks with his wife, minimally for 1 mile per walk.    Current Home Exercise   Type of Exercise Walking   Frequency (days per week) 7   Duration (minutes per session) 30-45   Follow-up/On-going Support   Provider follow-up needed on the following No follow-up needed    Learning Assessment   Learner Patient   Primary Language English   Preferred Learning Style Listening;Reading;Demonstration;Pictures/Video   Barriers to Learning No barriers noted   Patient Education   Education recommended Anatomy and Physiology of the Heart;Blood Pressure;Exercise Principles;Heart Failure;Medication Overview;Muscle Conditioning;Nutrition;Risk Factors;Smoking Cessation   Education classes attended Patient Declined/Refused All Education   Education Comments 6/6: Patient was advised of educational sessions and encouraged to attend.    Physician cosignature/electronic signature indicates approval of this ITP document. I have established, reviewed and made necessary changes to the individualized treatment plan and exercise prescription for this patient.

## 2018-07-02 ENCOUNTER — HOSPITAL ENCOUNTER (EMERGENCY)
Facility: HOSPITAL | Age: 63
Discharge: HOME | End: 2018-07-02
Attending: PHYSICIAN ASSISTANT | Admitting: PHYSICIAN ASSISTANT
Payer: COMMERCIAL

## 2018-07-02 VITALS
SYSTOLIC BLOOD PRESSURE: 129 MMHG | RESPIRATION RATE: 16 BRPM | TEMPERATURE: 98.3 F | OXYGEN SATURATION: 95 % | DIASTOLIC BLOOD PRESSURE: 62 MMHG | HEART RATE: 69 BPM
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
## 2018-07-02 PROCEDURE — G0463 HOSPITAL OUTPT CLINIC VISIT: HCPCS

## 2018-07-02 PROCEDURE — 99213 OFFICE O/P EST LOW 20 MIN: CPT | Performed by: PHYSICIAN ASSISTANT

## 2018-07-02 RX ORDER — CEPHALEXIN 500 MG/1
500 CAPSULE ORAL 3 TIMES DAILY
Qty: 15 CAPSULE | Refills: 0 | Status: SHIPPED | OUTPATIENT
Start: 2018-07-02 | End: 2018-07-07

## 2018-07-02 RX ORDER — MUPIROCIN CALCIUM 20 MG/G
CREAM TOPICAL 3 TIMES DAILY
Qty: 15 G | Refills: 0 | Status: SHIPPED | OUTPATIENT
Start: 2018-07-02

## 2018-07-02 NOTE — ED TRIAGE NOTES
Pt presents today alone for c/o a tick bite and removed it about a week ago and now has a red make where the tick was on his right upper lateral back/side.

## 2018-07-02 NOTE — ED AVS SNAPSHOT
[facility]    [address] [address]    Phone:  [phone]                                       Adolph Mott   MRN: [id number]    Department:  [facility]   Date of Visit:  7/2/2018           Patient Information     Date Of Birth          1955        Your diagnoses for this visit were:     Tick bite, initial encounter        You were seen by Erwin Mendez PA.      Follow-up Information     Follow up with Hailey Herman MD In 3 days.    Specialty:  Family Practice    Why:  For wound re-check    Contact information:    [facility]  [address] [facility] [address] [phone]          Follow up with [facility].    Specialty:  EMERGENCY MEDICINE    Why:  If symptoms worsen    Contact information:    [address] [address] [phone]    Additional information:    From [facility] ([place], [place]): Take [address]. Turn left at [address]. Turn left at the first stoplight on [address] [place] [address]. At the first stop sign, take a right onto [address]. Take a left into the parking lot and continue through until you reach the North enterance of the building.       From [place]: Take [address]. Take the [address] ramp towards [place]. Turn left onto [address]. Take a slight right onto [address]. Turn left at the first stoplight on [address]. At the first stop sign, take a right onto [address]. Take a left into the parking lot and continue through until you reach the North enterance of the building.       From [place]: Take [address]. Take a right at [address]. At the first stop sign, take a right onto [address]. Take a left into the parking lot and continue through until you reach the North enterance of the building.         Discharge Instructions       - this appears local in nature, so lets start with topical to cover staph  -  Start oral antibiotic vs recheck: if this does NOT work OR redness starts to go significantly beyond the marked border.   1. Topical bactroban 3x daily for 5-7 days MAX  2. If that doesn't work, start the oral antibiotic (3x daily for 5 days)    Discharge References/Attachments     TICK FACTS (ENGLISH)         Review of your medicines      START taking        Dose / Directions Last dose taken    cephALEXin 500 MG capsule   Commonly known as:  KEFLEX   Dose:  500 mg   Quantity:  15 capsule        Take 1 capsule (500 mg) by mouth 3 times daily for 5 days   Refills:  0        mupirocin 2 % cream   Commonly known as:  BACTROBAN   Quantity:  15 g        Apply topically 3 times daily   Refills:  0          Our records show that you are taking the medicines listed below. If these are incorrect, please call your family doctor or clinic.        Dose / Directions Last dose taken    ALEVE PO   Dose:  220 mg        Take 220 mg by mouth   Refills:  0        ASPIRIN PO   Dose:  81 mg        Take 81 mg by mouth daily   Refills:  0        BRILINTA PO   Dose:  90 mg        Take 90 mg by mouth 2 times daily   Refills:  0        LEVOTHYROXINE SODIUM PO   Dose:  50 mcg        Take 50 mcg by mouth Unsure of dosage   Refills:  0        LIPITOR PO   Dose:  80 mg        Take 80 mg by mouth   Refills:  0        LISINOPRIL PO   Dose:  5 mg        Take 5 mg by mouth   Refills:  0        METOPROLOL SUCCINATE ER PO   Dose:  25 mg        Take 25 mg by mouth   Refills:  0                Prescriptions were sent or printed at these locations (2 Prescriptions)                   [id number] - [place], [place] - [address] AT [facility] [address]   [address], [place] [address]    Telephone:  [phone]   Fax:  [phone]   Hours:                  E-Prescribed (1 of 2)         mupirocin (BACTROBAN) 2 % cream                 Printed at Department/Unit printer (1 of 2)         cephALEXin (KEFLEX) 500 MG capsule                 Orders Needing Specimen Collection     None      Pending Results     No orders found from 6/30/2018 to 7/3/2018.            Pending Culture Results     No orders found from 6/30/2018 to 7/3/2018.            Thank you for choosing [facility]       Thank you for choosing [facility] for your care. Our goal is always to provide you with excellent care. Hearing back from our patients is one way we can continue to improve our services. Please take a few minutes to complete the written survey that you may receive in the mail after you visit with us. Thank you!        Care EveryWhere ID     This is your Care EveryWhere ID. This could be used by other organizations to access your [facility] medical records  [id number]        Equal Access to Services     BONG DANIELLE : Richard Tamez, gretta tinoco, adelaida motta, mata deras. So [facility] [place] [phone].    ATENCIÓN: Si habla español, tiene a beck disposición servicios gratuitos de asistencia lingüística. Llame al [phone].    We comply with applicable federal civil rights laws and [place] laws. We do not discriminate on the basis of race, color, national origin, age, disability, sex, sexual orientation, or gender identity.            After Visit Summary       This is your record. Keep this with you and show to your community pharmacist(s) and doctor(s) at your next visit.

## 2018-07-02 NOTE — ED AVS SNAPSHOT
[facility]    [address] [place] [address]    Phone:  [phone]                                       Adolph Mott   MRN: [id number]    Department:  [facility]   Date of Visit:  7/2/2018           After Visit Summary Signature Page     I have received my discharge instructions, and my questions have been answered. I have discussed any challenges I see with this plan with the nurse or doctor.    ..........................................................................................................................................  Patient/Patient Representative Signature      ..........................................................................................................................................  Patient Representative Print Name and Relationship to Patient    ..................................................               ................................................  Date                                            Time    ..........................................................................................................................................  Reviewed by Signature/Title    ...................................................              ..............................................  Date                                                            Time

## 2018-07-02 NOTE — DISCHARGE INSTRUCTIONS
- this appears local in nature, so lets start with topical to cover staph  - Start oral antibiotic vs recheck: if this does NOT work OR redness starts to go significantly beyond the marked border.   1. Topical bactroban 3x daily for 5-7 days MAX  2. If that doesn't work, start the oral antibiotic (3x daily for 5 days)

## 2018-07-03 ASSESSMENT — ENCOUNTER SYMPTOMS
NEUROLOGICAL NEGATIVE: 1
CONSTITUTIONAL NEGATIVE: 1
COLOR CHANGE: 1
RESPIRATORY NEGATIVE: 1
CARDIOVASCULAR NEGATIVE: 1
PSYCHIATRIC NEGATIVE: 1

## 2018-07-03 NOTE — ED PROVIDER NOTES
History     Chief Complaint   Patient presents with     Tick Bite     right lateral back quarter sized redness from tick bite one week     HPI  Adolph Mott is a 62 year old male who presents for skin check after removing WOOD tick last week. Adolph feels like things are improving, but he is in due to urging of family. He denies any symptoms.     Problem List:    Patient Active Problem List    Diagnosis Date Noted     Traumatic amputation of fingertip      Priority: Medium        Past Medical History:    Past Medical History:   Diagnosis Date     Traumatic amputation of fingertip        Past Surgical History:    Past Surgical History:   Procedure Laterality Date     AMPUTATE FINGER(S) Right 7/26/2016    Procedure: AMPUTATE FINGER(S);  Surgeon: Francisco Dennis MD;  Location: [facility]       Family History:    Family History   Problem Relation Age of Onset     Family history unknown: Yes       Social History:  Marital Status:   [2]  Social History   Substance Use Topics     Smoking status: Current Every Day Smoker     Packs/day: 0.25     Years: 40.00     Types: Cigarettes     Last attempt to quit: 8/1/2016     Smokeless tobacco: Not on file     Alcohol use Not on file        Medications:      ASPIRIN PO   Atorvastatin Calcium (LIPITOR PO)   cephALEXin (KEFLEX) 500 MG capsule   LEVOTHYROXINE SODIUM PO   LISINOPRIL PO   METOPROLOL SUCCINATE ER PO   mupirocin (BACTROBAN) 2 % cream   Ticagrelor (BRILINTA PO)   Naproxen Sodium (ALEVE PO)         Review of Systems   Constitutional: Negative.    Respiratory: Negative.    Cardiovascular: Negative.    Skin: Positive for color change (around tick bite).   Neurological: Negative.    Psychiatric/Behavioral: Negative.        Physical Exam   BP: 129/62  Pulse: 69  Temp: 98.3  F (36.8  C)  Resp: 16  SpO2: 95 %      Physical Exam   Constitutional: He is oriented to person, place, and time. He appears well-developed and well-nourished. No distress.   Cardiovascular: Normal  rate.    Pulmonary/Chest: Effort normal.       Neurological: He is alert and oriented to person, place, and time.   Skin: Skin is warm and dry.   Psychiatric: He has a normal mood and affect.   Nursing note and vitals reviewed.      ED Course     ED Course     Procedures           No results found for this or any previous visit (from the past 24 hour(s)).    Medications - No data to display    Assessments & Plan (with Medical Decision Making)     I have reviewed the nursing notes.    I have reviewed the findings, diagnosis, plan and need for follow up with the patient.      Discharge Medication List as of 7/2/2018  2:15 PM      START taking these medications    Details   cephALEXin (KEFLEX) 500 MG capsule Take 1 capsule (500 mg) by mouth 3 times daily for 5 days, Disp-15 capsule, R-0, Local Print      mupirocin (BACTROBAN) 2 % cream Apply topically 3 times dailyDisp-15 g, [id number]             Final diagnoses:   Tick bite, initial encounter   Skin changes consistent with local response. Adolph will monitor and recheck with worsening, tenderness, fever. Patient verbally educated and given appropriate education sheets for each of the diagnoses and has no questions.    Erwin Mendez PA-C   7/3/2018   9:05 AM      7/2/2018   [facility]     Erwin Mendez PA  07/03/18 0904       Erwin Mendez PA  07/03/18 0905

## 2019-10-15 ENCOUNTER — TRANSFERRED RECORDS (OUTPATIENT)
Dept: HEALTH INFORMATION MANAGEMENT | Facility: HOSPITAL | Age: 64
End: 2019-10-15

## 2019-10-15 LAB — EJECTION FRACTION: 68 %

## 2020-05-13 ENCOUNTER — TRANSFERRED RECORDS (OUTPATIENT)
Dept: HEALTH INFORMATION MANAGEMENT | Facility: HOSPITAL | Age: 65
End: 2020-05-13

## 2020-12-21 ENCOUNTER — MEDICAL CORRESPONDENCE (OUTPATIENT)
Dept: ULTRASOUND IMAGING | Facility: HOSPITAL | Age: 65
End: 2020-12-21

## 2020-12-23 ENCOUNTER — HOSPITAL ENCOUNTER (OUTPATIENT)
Dept: ULTRASOUND IMAGING | Facility: HOSPITAL | Age: 65
Discharge: HOME | End: 2020-12-23
Attending: PHYSICIAN ASSISTANT | Admitting: PHYSICIAN ASSISTANT
Payer: COMMERCIAL

## 2020-12-23 DIAGNOSIS — Z13.9 SCREENING FOR UNSPECIFIED CONDITION: ICD-10-CM

## 2020-12-23 PROCEDURE — 76775 US EXAM ABDO BACK WALL LIM: CPT

## 2021-01-08 ENCOUNTER — ANESTHESIA EVENT (OUTPATIENT)
Dept: SURGERY | Facility: HOSPITAL | Age: 66
End: 2021-01-08
Payer: COMMERCIAL

## 2021-01-08 RX ORDER — BUSPIRONE HYDROCHLORIDE 10 MG/1
10 TABLET ORAL 2 TIMES DAILY
COMMUNITY

## 2021-01-08 RX ORDER — TRAMADOL HYDROCHLORIDE 50 MG/1
50 TABLET ORAL EVERY 6 HOURS PRN
COMMUNITY

## 2021-01-08 RX ORDER — ACAMPROSATE CALCIUM 333 MG/1
333 TABLET, DELAYED RELEASE ORAL 2 TIMES DAILY
COMMUNITY

## 2021-01-08 ASSESSMENT — LIFESTYLE VARIABLES: TOBACCO_USE: 1

## 2021-01-08 NOTE — ANESTHESIA PREPROCEDURE EVALUATION
Anesthesia Pre-Procedure Evaluation    Patient: Adolph Mott   MRN: [id number] : 1955          Preoperative Diagnosis: Screening for malignant neoplasm of colon [Z12.11]    Procedure(s):  COLONOSCOPY    Past Medical History:   Diagnosis Date     Traumatic amputation of fingertip      Past Surgical History:   Procedure Laterality Date     AMPUTATE FINGER(S) Right 2016    Procedure: AMPUTATE FINGER(S);  Surgeon: Francisco Dennis MD;  Location: [facility]       Anesthesia Evaluation     . Pt has had prior anesthetic. Type: General and MAC           ROS/MED HX    ENT/Pulmonary:     (+)tobacco use, Past use , . .    Neurologic:  - neg neurologic ROS     Cardiovascular:     (+) --CAD, -past MI,-stent,  1 . : . . . :. . Previous cardiac testing Echodate:2019results:EF 68%date: results: date: results: date: results:          METS/Exercise Tolerance:  >4 METS   Hematologic:  - neg hematologic  ROS       Musculoskeletal:  - neg musculoskeletal ROS       GI/Hepatic:     (+) bowel prep,       Renal/Genitourinary:  - ROS Renal section negative       Endo:     (+) thyroid problem hypothyroidism, .      Psychiatric:  - neg psychiatric ROS       Infectious Disease:  - neg infectious disease ROS       Malignancy:      - no malignancy   Other:    - neg other ROS                      Physical Exam  Normal systems: cardiovascular and pulmonary    Airway   Mallampati: III  TM distance: >3 FB  Neck ROM: full    Dental   (+) caps    Cardiovascular   Rhythm and rate: regular and normal      Pulmonary    breath sounds clear to auscultation            Lab Results   Component Value Date    WBC 8.0 2018    HGB 16.0 2018    HCT 46.4 2018     2018     2018    POTASSIUM 3.9 2018    CHLORIDE 107 2018    CO2 26 2018    BUN 16 2018    CR 1.10 2018    GLC 99 2018    YEIMI 8.7 2018    ALBUMIN 3.8 2016    PROTTOTAL 7.3 2016    ALT 27 2016     AST 28 07/26/2016    ALKPHOS 93 07/26/2016    BILITOTAL 1.0 07/26/2016       Preop Vitals  BP Readings from Last 3 Encounters:   07/02/18 129/62   04/05/18 106/55   09/21/16 134/76    Pulse Readings from Last 3 Encounters:   07/02/18 69   04/05/18 61   09/21/16 79      Resp Readings from Last 3 Encounters:   07/02/18 16   04/05/18 16   08/23/16 17    SpO2 Readings from Last 3 Encounters:   07/02/18 95%   04/05/18 94%   09/21/16 95%      Temp Readings from Last 1 Encounters:   07/02/18 98.3  F (36.8  C) (Tympanic)    Ht Readings from Last 1 Encounters:   06/06/18 1.829 m (6')      Wt Readings from Last 1 Encounters:   06/06/18 84.4 kg (186 lb)    Estimated body mass index is 25.23 kg/m  as calculated from the following:    Height as of 6/6/18: 1.829 m (6').    Weight as of 6/6/18: 84.4 kg (186 lb).       Anesthesia Plan      History & Physical Review  History and physical reviewed and following examination; no interval change.    ASA Status:  3 .    NPO Status:  > 8 hours    Plan for MAC with Intravenous and Propofol induction. Maintenance will be TIVA.  Reason for MAC:  Deep or markedly invasive procedure (G8)    Risks and benefits of MAC anesthetic discussed including dental damage, aspiration, loss of airway, conversion to general anesthetic, CV complications, MI, stroke, death. Pt wishes to proceed.         Postoperative Care      Consents  Anesthetic plan, risks, benefits and alternatives discussed with:  Patient..                 PATRICIA Cummings CRNA

## 2021-01-11 ENCOUNTER — OFFICE VISIT (OUTPATIENT)
Dept: FAMILY MEDICINE | Facility: OTHER | Age: 66
End: 2021-01-11
Attending: FAMILY MEDICINE
Payer: COMMERCIAL

## 2021-01-11 DIAGNOSIS — Z20.822 COVID-19 RULED OUT: Primary | ICD-10-CM

## 2021-01-11 PROCEDURE — U0003 INFECTIOUS AGENT DETECTION BY NUCLEIC ACID (DNA OR RNA); SEVERE ACUTE RESPIRATORY SYNDROME CORONAVIRUS 2 (SARS-COV-2) (CORONAVIRUS DISEASE [COVID-19]), AMPLIFIED PROBE TECHNIQUE, MAKING USE OF HIGH THROUGHPUT TECHNOLOGIES AS DESCRIBED BY CMS-2020-01-R: HCPCS | Mod: ZL | Performed by: FAMILY MEDICINE

## 2021-01-11 PROCEDURE — U0005 INFEC AGEN DETEC AMPLI PROBE: HCPCS | Mod: ZL | Performed by: FAMILY MEDICINE

## 2021-01-12 LAB
SARS-COV-2 RNA RESP QL NAA+PROBE: NOT DETECTED
SPECIMEN SOURCE: NORMAL

## 2021-01-15 ENCOUNTER — ANESTHESIA (OUTPATIENT)
Dept: SURGERY | Facility: HOSPITAL | Age: 66
End: 2021-01-15
Payer: COMMERCIAL

## 2021-01-15 ENCOUNTER — HOSPITAL ENCOUNTER (OUTPATIENT)
Facility: HOSPITAL | Age: 66
Discharge: HOME | End: 2021-01-15
Attending: INTERNAL MEDICINE | Admitting: INTERNAL MEDICINE
Payer: COMMERCIAL

## 2021-01-15 VITALS
RESPIRATION RATE: 16 BRPM | HEIGHT: 72 IN | OXYGEN SATURATION: 96 % | TEMPERATURE: 97.5 F | BODY MASS INDEX: 24.92 KG/M2 | WEIGHT: 184 LBS | HEART RATE: 66 BPM | SYSTOLIC BLOOD PRESSURE: 106 MMHG | DIASTOLIC BLOOD PRESSURE: 60 MMHG

## 2021-01-15 PROCEDURE — 360N000075 HC SURGERY LEVEL 2, PER MIN: Performed by: INTERNAL MEDICINE

## 2021-01-15 PROCEDURE — 250N000009 HC RX 250: Performed by: NURSE ANESTHETIST, CERTIFIED REGISTERED

## 2021-01-15 PROCEDURE — 250N000011 HC RX IP 250 OP 636: Performed by: NURSE ANESTHETIST, CERTIFIED REGISTERED

## 2021-01-15 PROCEDURE — 370N000017 HC ANESTHESIA TECHNICAL FEE, PER MIN: Performed by: INTERNAL MEDICINE

## 2021-01-15 PROCEDURE — 45378 DIAGNOSTIC COLONOSCOPY: CPT | Performed by: NURSE ANESTHETIST, CERTIFIED REGISTERED

## 2021-01-15 PROCEDURE — 999N000138 HC STATISTIC PRE-PROCEDURE ASSESSMENT I: Performed by: INTERNAL MEDICINE

## 2021-01-15 PROCEDURE — 710N000012 HC RECOVERY PHASE 2, PER MINUTE: Performed by: INTERNAL MEDICINE

## 2021-01-15 RX ORDER — NALOXONE HYDROCHLORIDE 0.4 MG/ML
0.4 INJECTION, SOLUTION INTRAMUSCULAR; INTRAVENOUS; SUBCUTANEOUS
Status: DISCONTINUED | OUTPATIENT
Start: 2021-01-15 | End: 2021-01-15 | Stop reason: HOSPADM

## 2021-01-15 RX ORDER — LIDOCAINE 40 MG/G
CREAM TOPICAL
Status: DISCONTINUED | OUTPATIENT
Start: 2021-01-15 | End: 2021-01-15 | Stop reason: HOSPADM

## 2021-01-15 RX ORDER — LIDOCAINE HYDROCHLORIDE 20 MG/ML
INJECTION, SOLUTION INFILTRATION; PERINEURAL PRN
Status: DISCONTINUED | OUTPATIENT
Start: 2021-01-15 | End: 2021-01-15

## 2021-01-15 RX ORDER — PROPOFOL 10 MG/ML
INJECTION, EMULSION INTRAVENOUS PRN
Status: DISCONTINUED | OUTPATIENT
Start: 2021-01-15 | End: 2021-01-15

## 2021-01-15 RX ORDER — SODIUM CHLORIDE, SODIUM LACTATE, POTASSIUM CHLORIDE, CALCIUM CHLORIDE 600; 310; 30; 20 MG/100ML; MG/100ML; MG/100ML; MG/100ML
INJECTION, SOLUTION INTRAVENOUS CONTINUOUS
Status: DISCONTINUED | OUTPATIENT
Start: 2021-01-15 | End: 2021-01-15 | Stop reason: HOSPADM

## 2021-01-15 RX ORDER — NALOXONE HYDROCHLORIDE 0.4 MG/ML
0.2 INJECTION, SOLUTION INTRAMUSCULAR; INTRAVENOUS; SUBCUTANEOUS
Status: DISCONTINUED | OUTPATIENT
Start: 2021-01-15 | End: 2021-01-15 | Stop reason: HOSPADM

## 2021-01-15 RX ORDER — ONDANSETRON 2 MG/ML
4 INJECTION INTRAMUSCULAR; INTRAVENOUS EVERY 30 MIN PRN
Status: DISCONTINUED | OUTPATIENT
Start: 2021-01-15 | End: 2021-01-15 | Stop reason: HOSPADM

## 2021-01-15 RX ORDER — MEPERIDINE HYDROCHLORIDE 25 MG/ML
12.5 INJECTION INTRAMUSCULAR; INTRAVENOUS; SUBCUTANEOUS
Status: DISCONTINUED | OUTPATIENT
Start: 2021-01-15 | End: 2021-01-15 | Stop reason: HOSPADM

## 2021-01-15 RX ORDER — ONDANSETRON 4 MG/1
4 TABLET, ORALLY DISINTEGRATING ORAL EVERY 30 MIN PRN
Status: DISCONTINUED | OUTPATIENT
Start: 2021-01-15 | End: 2021-01-15 | Stop reason: HOSPADM

## 2021-01-15 RX ADMIN — LIDOCAINE HYDROCHLORIDE 40 MG: 20 INJECTION, SOLUTION INFILTRATION; PERINEURAL at 12:24

## 2021-01-15 RX ADMIN — PROPOFOL 50 MG: 10 INJECTION, EMULSION INTRAVENOUS at 12:31

## 2021-01-15 RX ADMIN — PROPOFOL 50 MG: 10 INJECTION, EMULSION INTRAVENOUS at 12:26

## 2021-01-15 RX ADMIN — PROPOFOL 50 MG: 10 INJECTION, EMULSION INTRAVENOUS at 12:24

## 2021-01-15 ASSESSMENT — MIFFLIN-ST. JEOR: SCORE: 1652.62

## 2021-01-15 NOTE — BRIEF OP NOTE
[facility]    Brief Operative Note    Pre-operative diagnosis: Screening for malignant neoplasm of colon [Z12.11]  Post-operative diagnosis Left sided diverticulosis, Internal Hemorrhoids    Procedure: Procedure(s):  COLONOSCOPY  Surgeon: Surgeon(s) and Role:     * Del Osorio MD - Primary  Anesthesia: Monitor Anesthesia Care   Estimated blood loss: None  Drains: None  Specimens: * No specimens in log *  Findings:   None.  Complications: None.  Implants: * No implants in log *

## 2021-01-15 NOTE — H&P
Pre-Endoscopy History and Physical     Adolph Mott MRN# [id number]   YOB: 1955 Age: 65 year old     Primary care provider: Hailey Herman  Type of Endoscopy: Colonoscopy with possible biopsy, possible polypectomy  Reason for Procedure: Screening  Type of Anesthesia Anticipated: MAC    HPI:    Adolph is a 65 year old male who will be undergoing the above procedure.      A history and physical has been performed. The patient's medications and allergies have been reviewed. The risks and benefits of the procedure and the sedation options and risks were discussed with the patient.  All questions were answered and informed consent was obtained.      He denies a personal or family history of anesthesia complications or bleeding disorders.     Patient Active Problem List   Diagnosis     Traumatic amputation of fingertip        Past Medical History:   Diagnosis Date     Traumatic amputation of fingertip         Past Surgical History:   Procedure Laterality Date     AMPUTATE FINGER(S) Right 2016    Procedure: AMPUTATE FINGER(S);  Surgeon: Francisco Dennis MD;  Location: [facility]     COLONOSCOPY - HIM SCAN N/A 08/10/2011    History of colonoscopy 8/10/11 - rec 10 yrs       Social History     Tobacco Use     Smoking status: Former Smoker     Packs/day: 0.25     Years: 40.00     Pack years: 10.00     Types: Cigarettes     Quit date: 2016     Years since quittin.4     Smokeless tobacco: Never Used   Substance Use Topics     Alcohol use: Not on file       Family History   Family history unknown: Yes       Prior to Admission medications    Medication Sig Start Date End Date Taking? Authorizing Provider   ASPIRIN PO Take 81 mg by mouth daily   Yes Reported, Patient   Atorvastatin Calcium (LIPITOR PO) Take 80 mg by mouth   Yes Reported, Patient   busPIRone (BUSPAR) 10 MG tablet Take 10 mg by mouth 2 times daily   Yes Reported, Patient   LEVOTHYROXINE SODIUM PO Take 75 mcg by mouth Unsure of dosage     Yes Reported, Patient   traMADol (ULTRAM) 50 MG tablet Take 50 mg by mouth every 6 hours as needed for severe pain   Yes Reported, Patient   acamprosate (CAMPRAL) 333 MG EC tablet Take 333 mg by mouth 2 times daily    Reported, Patient   mupirocin (BACTROBAN) 2 % cream Apply topically 3 times daily 7/2/18   Erwin Mendez, PA   Naproxen Sodium (ALEVE PO) Take 220 mg by mouth    Reported, Patient   Ticagrelor (BRILINTA PO) Take 90 mg by mouth 2 times daily    Reported, Patient       Allergies   Allergen Reactions     Scopolamine Other (See Comments)     Confusion        REVIEW OF SYSTEMS:   5 point ROS negative except as noted above in HPI, including Gen., Resp., CV, GI &  system review.    PHYSICAL EXAM:   /81   Pulse 77   Temp 97  F (36.1  C) (Tympanic)   Resp 16   Ht 1.829 m (6')   Wt 83.5 kg (184 lb)   SpO2 95%   BMI 24.95 kg/m   Estimated body mass index is 24.95 kg/m  as calculated from the following:    Height as of this encounter: 1.829 m (6').    Weight as of this encounter: 83.5 kg (184 lb).   GENERAL APPEARANCE: alert, and oriented  MENTAL STATUS: alert  AIRWAY EXAM: Mallampatti Class II (visualization of the soft palate, fauces, and uvula)  RESP: lungs clear to auscultation - no rales, rhonchi or wheezes  CV: regular rates and rhythm  DIAGNOSTICS:    Not indicated    IMPRESSION   ASA Class 2 - Mild systemic disease    PLAN:   Plan for Colonoscopy with possible biopsy, possible polypectomy. We discussed the risks, benefits and alternatives and the patient wished to proceed.    The above has been forwarded to the consulting provider.      Signed Electronically by: Del Osorio MD  January 15, 2021

## 2021-01-15 NOTE — ANESTHESIA CARE TRANSFER NOTE
Patient: Adolph Mott    Procedure(s):  COLONOSCOPY    Diagnosis: Screening for malignant neoplasm of colon [Z12.11]  Diagnosis Additional Information: No value filed.    Anesthesia Type:   MAC     Note:  Airway :Nasal Cannula  Patient transferred to:Phase II  Handoff Report: Identifed the Patient, Identified the Reponsible Provider, Reviewed the pertinent medical history, Discussed the surgical course, Reviewed Intra-OP anesthesia mangement and issues during anesthesia, Set expectations for post-procedure period and Allowed opportunity for questions and acknowledgement of understanding      Vitals: (Last set prior to Anesthesia Care Transfer)    CRNA VITALS  1/15/2021 1203 - 1/15/2021 1234      1/15/2021             Resp Rate (set):  8                Electronically Signed By: PATRICIA Cummings CRNA  January 15, 2021  12:34 PM

## 2021-01-15 NOTE — ANESTHESIA POSTPROCEDURE EVALUATION
Patient: Adolph Mott    Procedure(s):  COLONOSCOPY    Diagnosis:Screening for malignant neoplasm of colon [Z12.11]  Diagnosis Additional Information: No value filed.    Anesthesia Type:  MAC    Note:  Anesthesia Post Evaluation    Patient location during evaluation: Bedside and Phase 2  Patient participation: Able to fully participate in evaluation  Level of consciousness: awake and alert  Pain management: adequate  Airway patency: patent  Cardiovascular status: acceptable  Respiratory status: acceptable and room air  Hydration status: acceptable  PONV: none     Anesthetic complications: None          Last vitals:  Vitals:    01/15/21 1254 01/15/21 1259 01/15/21 1304   BP: 105/58 112/64 112/64   Pulse: 60 74 68   Resp: 18 16 16   Temp:      SpO2: 96% 98% 94%         Electronically Signed By: PATRICIA Cummings CRNA  January 15, 2021  1:18 PM

## 2021-01-15 NOTE — OP NOTE
Procedure Date: 01/15/2021      PRIMARY CARE PHYSICIAN:  Hailey Herman MD      PROCEDURE:  Colonoscopy.      PREPROCEDURE DIAGNOSIS:  Screening colonoscopy.      HISTORY OF PRESENT ILLNESS:  Please refer to H and P for details.      PHYSICAL EXAMINATION:  Cardiopulmonary examination unchanged from previous exam.  Please refer to H and P for further details.      DESCRIPTION OF PROCEDURE:  After informed consent was obtained, the patient was placed in the left lateral decubitus position.  An adult video colonoscope was advanced all the way to the cecum.  Copious irrigation and suctioning improved our views.  During the slow withdrawal, I noted left-sided moderate diverticulosis.  A retroflexion view in the rectum revealed small and diminutive internal hemorrhoids, none of which were bleeding.  No polyps, masses or any other abnormalities noted.  The air was then desufflated and the scope was withdrawn fully and completely without any complications.      POST-PROCEDURE DIAGNOSES:   1.  Left-sided diverticulosis.   2.  Small internal hemorrhoids.      RECOMMENDATIONS:   1.  High-fiber diet.   2.  One tablespoon of Metamucil at night.   3.  Repeat colonoscopy in 10 years.   4.  Discharge home.      Thank you for allowing us to participate in his management.         JENNY TORRES MD             D: 01/15/2021   T: 01/15/2021   MT: NAHID      Name:     NISAH RATLIFF   MRN:      [id number]        Account:        [id number]   :      1955           Procedure Date: 01/15/2021      Document: [id number]       cc: Hailey Herman MD

## 2021-01-15 NOTE — DISCHARGE INSTRUCTIONS

## 2021-01-15 NOTE — OR NURSING
Patient and responsible adult given discharge instructions with no questions regarding instructions. Von score 18/18. Pain level 0/10.  Discharged from unit via ambulatory. Patient discharged to home.

## 2022-04-08 NOTE — ED NOTES
Nitroglycerin repeated for continued mid chest pain at 8/10. Heart rate in the 40's with ST elevation noted. Patient continues to be diaphoretic. His wife and daughter were notified and are on their way to the ED.   no

## 2025-07-28 PROBLEM — E78.5 HYPERLIPIDEMIA: Status: ACTIVE | Noted: 2025-05-14

## 2025-07-28 PROBLEM — C61 CARCINOMA OF PROSTATE (H): Status: ACTIVE | Noted: 2025-05-14

## 2025-07-28 PROBLEM — E03.4 ATROPHY OF THYROID (ACQUIRED): Status: ACTIVE | Noted: 2019-03-28

## 2025-07-28 PROBLEM — M67.442 GANGLION CYST OF TENDON SHEATH OF LEFT HAND: Status: ACTIVE | Noted: 2025-05-14

## 2025-07-28 PROBLEM — E03.9 HYPOTHYROIDISM, UNSPECIFIED: Status: ACTIVE | Noted: 2025-07-28

## 2025-07-28 PROBLEM — I25.10 ARTERIOSCLEROSIS OF CORONARY ARTERY: Status: ACTIVE | Noted: 2019-03-28

## 2025-07-28 PROBLEM — M19.031 PRIMARY OSTEOARTHRITIS, RIGHT WRIST: Status: ACTIVE | Noted: 2019-03-28

## 2025-07-28 PROBLEM — M15.9 GENERALIZED OSTEOARTHRITIS: Status: ACTIVE | Noted: 2025-05-14

## 2025-07-28 PROBLEM — E78.00 ELEVATED LDL CHOLESTEROL LEVEL: Status: ACTIVE | Noted: 2019-03-28

## 2025-07-28 PROBLEM — F41.8 SITUATIONAL ANXIETY: Status: ACTIVE | Noted: 2025-05-14

## 2025-07-28 PROBLEM — L40.9 PSORIASIS: Status: ACTIVE | Noted: 2019-03-28

## 2025-07-28 PROBLEM — M47.812 CERVICAL SPONDYLOSIS: Status: ACTIVE | Noted: 2025-07-28

## (undated) DEVICE — TUBING-SUCTION 20FT

## (undated) DEVICE — CONNECTOR-ERBEFLO 2 PORT

## (undated) DEVICE — LUBRICANT JELLY 2OZ. TUBE

## (undated) DEVICE — IRRIGATION-H2O 1000ML

## (undated) RX ORDER — LIDOCAINE HYDROCHLORIDE 20 MG/ML
INJECTION, SOLUTION EPIDURAL; INFILTRATION; INTRACAUDAL; PERINEURAL
Status: DISPENSED
Start: 2021-01-15

## (undated) RX ORDER — PROPOFOL 10 MG/ML
INJECTION, EMULSION INTRAVENOUS
Status: DISPENSED
Start: 2021-01-15